# Patient Record
Sex: FEMALE | Race: OTHER | NOT HISPANIC OR LATINO
[De-identification: names, ages, dates, MRNs, and addresses within clinical notes are randomized per-mention and may not be internally consistent; named-entity substitution may affect disease eponyms.]

---

## 2020-08-04 PROBLEM — Z00.00 ENCOUNTER FOR PREVENTIVE HEALTH EXAMINATION: Status: ACTIVE | Noted: 2020-08-04

## 2020-08-07 ENCOUNTER — APPOINTMENT (OUTPATIENT)
Dept: NEUROLOGY | Facility: CLINIC | Age: 25
End: 2020-08-07

## 2020-08-24 ENCOUNTER — APPOINTMENT (OUTPATIENT)
Dept: NEUROLOGY | Facility: CLINIC | Age: 25
End: 2020-08-24
Payer: MEDICAID

## 2020-08-24 VITALS
SYSTOLIC BLOOD PRESSURE: 84 MMHG | HEIGHT: 63 IN | HEART RATE: 83 BPM | TEMPERATURE: 98 F | BODY MASS INDEX: 19.49 KG/M2 | WEIGHT: 110 LBS | DIASTOLIC BLOOD PRESSURE: 54 MMHG

## 2020-08-24 VITALS — TEMPERATURE: 97.8 F

## 2020-08-24 DIAGNOSIS — Z78.9 OTHER SPECIFIED HEALTH STATUS: ICD-10-CM

## 2020-08-24 DIAGNOSIS — H55.01 CONGENITAL NYSTAGMUS: ICD-10-CM

## 2020-08-24 PROCEDURE — 99205 OFFICE O/P NEW HI 60 MIN: CPT

## 2020-08-24 RX ORDER — FLUTICASONE PROPIONATE 50 UG/1
50 SPRAY, METERED NASAL
Qty: 16 | Refills: 0 | Status: DISCONTINUED | COMMUNITY
Start: 2020-05-04

## 2020-08-24 RX ORDER — LEVETIRACETAM 750 MG/1
750 TABLET, FILM COATED ORAL
Qty: 60 | Refills: 0 | Status: DISCONTINUED | COMMUNITY
Start: 2020-08-20

## 2020-08-24 RX ORDER — TERCONAZOLE 8 MG/G
0.8 CREAM VAGINAL
Qty: 20 | Refills: 0 | Status: DISCONTINUED | COMMUNITY
Start: 2020-06-17

## 2020-08-24 RX ORDER — AZITHROMYCIN 250 MG/1
250 TABLET, FILM COATED ORAL
Qty: 6 | Refills: 0 | Status: DISCONTINUED | COMMUNITY
Start: 2020-05-04

## 2020-08-24 RX ORDER — LEVETIRACETAM 500 MG/1
500 TABLET, FILM COATED ORAL
Qty: 60 | Refills: 0 | Status: DISCONTINUED | COMMUNITY
Start: 2020-03-18

## 2020-08-24 RX ORDER — LORATADINE 10 MG/1
10 TABLET ORAL
Qty: 30 | Refills: 0 | Status: DISCONTINUED | COMMUNITY
Start: 2020-05-04

## 2020-08-24 NOTE — HISTORY OF PRESENT ILLNESS
[FreeTextEntry1] : prior neurologist - Faviola Marsh MD -- Address: [facility] [place] [address] -- Phone: [phone]\par \par *** 08/24/2020  ***\par ELIO is a 25 y F LH with h/o seizure since 14yo.  Initially treated with divalproex, and changed to levetiracetam 750 q12 at age 15.  ELIO has seizures when she forgets to take levetiracetam.  Last seizure was last friday at 5a (forgot to take meds).  Mother heard a cry, and found ELIO with tonic posture - 20 - 30 seconds, no clonic movements.  Prior to that seizure was in March 16 2020, also because forgot to take medication.  Seizures may occur anytime, day or night. 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  \par \par FH - no h/o seizure, no illnesses common to family.\par ROS - netgative x 14 systems. \par \par Dev Hx - no febrile seizures, full term, normal birth,  no CNS infections, dx with ADD.

## 2020-08-24 NOTE — PHYSICAL EXAM
[FreeTextEntry1] : MS: alert & oriented to person, place time, good fund of knowledge and recall for recent and remote events. \par CN: VFF to confrontation, OD - 20/400, OS - 20/800, left beating nystagmus with right eye covering. right beating nystagmus with left eye covering. EOM full, ambylopia.  EOM full without nystagmus, PERRL, V1-V3 intact to light touch, face asymmetrical decreased NLF left, hears finger rub bilaterally, palate elevates symmetrically, shoulders elevate symmetrically, tongue midline\par MOTOR: normal tone x 4 extremities, Power 5/5 proximally and distally bilaterally, no drift, normal rapid alternating movements. \par SENSORY: intact LT x 4 extremities\par REFLEXES: biceps 2+ bilaterally, triceps 2+ bilaterally, brachioradialis 2+ bilaterally, patella 2+ bilaterally, ankle 2+ bilaterally, plantar flexor bilaterally\par COORD: normal FNF, no tremor or dysmetria\par GAIT: normal base, Romberg negative, normal gait, able to walk on toes, heels and tandem.

## 2020-08-24 NOTE — ASSESSMENT
[FreeTextEntry1] : ELIO developed seizures at age 13, changed to levetiracetam at age 15 due to breakthrough seizures (mother thinks that breakthrough seizures are due to missed doses). Last seizure occurred last week, prior to that occurred in March, both attributed to missed doses of levetiracetam. \par \par Plan:\par 1. change levetiracetam  q12 for better missed dose forgiveness.\par 2. MRI brain\par 3. amb EEG x 48 \par 4. RTC 6 wks \par 5. may need neuroophthalmology consult - pt endorses that her ophthalmologist has recommended prisms. \par \par -patient will receive information on seizure first aid, and orientation to epilepsy. \par -patient will receive information on mindfulness and basic exercise useful for stress reduction with goal of improved seizure control\par \par I have spent 60 minutes of face to face time with the patient reviewing the cause of seizures or seizure-like events, assessing the risk of recurrence, educating the patient or family to recognize seizures, and discussing possible treatment options and possible side effects of seizure medications. I also discussed seizure safety, and ways of reducing seizure risk. Greater than 50% of the encounter time was spent on counseling and coordination of care for reviewing records in [employer], discussion with patient regarding plan.

## 2020-08-24 NOTE — REASON FOR VISIT
[Initial Eval - Existing Diagnosis] : an initial evaluation of an existing diagnosis [Parent] : parent

## 2020-08-25 ENCOUNTER — TRANSCRIPTION ENCOUNTER (OUTPATIENT)
Age: 25
End: 2020-08-25

## 2020-09-21 ENCOUNTER — APPOINTMENT (OUTPATIENT)
Dept: NEUROLOGY | Facility: CLINIC | Age: 25
End: 2020-09-21
Payer: MEDICAID

## 2020-09-21 PROCEDURE — 95816 EEG AWAKE AND DROWSY: CPT

## 2020-09-22 PROCEDURE — 95708 EEG WO VID EA 12-26HR UNMNTR: CPT

## 2020-09-23 PROCEDURE — 95721 EEG PHY/QHP>36<60 HR W/O VID: CPT

## 2020-09-23 PROCEDURE — 95700 EEG CONT REC W/VID EEG TECH: CPT

## 2020-09-23 PROCEDURE — 95708 EEG WO VID EA 12-26HR UNMNTR: CPT

## 2020-09-27 ENCOUNTER — NON-APPOINTMENT (OUTPATIENT)
Age: 25
End: 2020-09-27

## 2020-10-05 ENCOUNTER — APPOINTMENT (OUTPATIENT)
Dept: NEUROLOGY | Facility: CLINIC | Age: 25
End: 2020-10-05

## 2020-10-14 ENCOUNTER — APPOINTMENT (OUTPATIENT)
Dept: MRI IMAGING | Facility: CLINIC | Age: 25
End: 2020-10-14
Payer: MEDICAID

## 2020-10-14 ENCOUNTER — TRANSCRIPTION ENCOUNTER (OUTPATIENT)
Age: 25
End: 2020-10-14

## 2020-10-14 ENCOUNTER — OUTPATIENT (OUTPATIENT)
Dept: OUTPATIENT SERVICES | Facility: HOSPITAL | Age: 25
LOS: 1 days | End: 2020-10-14
Payer: MEDICAID

## 2020-10-14 DIAGNOSIS — Z00.8 ENCOUNTER FOR OTHER GENERAL EXAMINATION: ICD-10-CM

## 2020-10-14 PROCEDURE — 70551 MRI BRAIN STEM W/O DYE: CPT | Mod: 26

## 2020-10-14 PROCEDURE — 70551 MRI BRAIN STEM W/O DYE: CPT

## 2020-12-08 ENCOUNTER — NON-APPOINTMENT (OUTPATIENT)
Age: 25
End: 2020-12-08

## 2021-03-30 ENCOUNTER — APPOINTMENT (OUTPATIENT)
Dept: NEUROLOGY | Facility: CLINIC | Age: 26
End: 2021-03-30
Payer: MEDICAID

## 2021-03-30 VITALS
SYSTOLIC BLOOD PRESSURE: 90 MMHG | WEIGHT: 101 LBS | BODY MASS INDEX: 17.89 KG/M2 | HEIGHT: 63 IN | HEART RATE: 83 BPM | DIASTOLIC BLOOD PRESSURE: 58 MMHG

## 2021-03-30 VITALS — TEMPERATURE: 97.2 F

## 2021-03-30 PROCEDURE — 99072 ADDL SUPL MATRL&STAF TM PHE: CPT

## 2021-03-30 PROCEDURE — 99214 OFFICE O/P EST MOD 30 MIN: CPT

## 2021-03-31 RX ORDER — GUAIFENESIN AND DEXTROMETHORPHAN HYDROBROMIDE 100; 10 MG/5ML; MG/5ML
100-10 LIQUID ORAL
Qty: 120 | Refills: 0 | Status: ACTIVE | COMMUNITY
Start: 2021-03-22

## 2021-03-31 RX ORDER — KETOCONAZOLE 20.5 MG/ML
2 SHAMPOO, SUSPENSION TOPICAL
Qty: 120 | Refills: 0 | Status: ACTIVE | COMMUNITY
Start: 2021-02-23

## 2021-03-31 RX ORDER — TRETINOIN 1 MG/G
0.1 CREAM TOPICAL
Qty: 45 | Refills: 0 | Status: ACTIVE | COMMUNITY
Start: 2021-02-23

## 2021-03-31 RX ORDER — PREDNISONE 10 MG/1
10 TABLET ORAL
Qty: 30 | Refills: 0 | Status: ACTIVE | COMMUNITY
Start: 2021-03-22

## 2021-03-31 RX ORDER — CLINDAMYCIN PHOSPHATE AND BENZOYL PEROXIDE 10; 25 MG/G; MG/G
1.2-2.5 GEL TOPICAL
Qty: 50 | Refills: 0 | Status: COMPLETED | COMMUNITY
Start: 2021-02-23

## 2021-03-31 RX ORDER — FLUTICASONE PROPIONATE 0.5 MG/G
0.05 CREAM TOPICAL
Qty: 60 | Refills: 0 | Status: ACTIVE | COMMUNITY
Start: 2021-02-23

## 2021-03-31 RX ORDER — ALBUTEROL SULFATE 90 UG/1
108 (90 BASE) INHALANT RESPIRATORY (INHALATION)
Qty: 18 | Refills: 0 | Status: ACTIVE | COMMUNITY
Start: 2021-03-20

## 2021-03-31 NOTE — HISTORY OF PRESENT ILLNESS
[FreeTextEntry1] : prior neurologist - Faviola Marsh MD -- Address: [facility] [place] [address] -- Phone: [phone]\par \par *** 08/24/2020  ***\par ELIO is a 25 y F LH with h/o seizure since 12yo.  Initially treated with divalproex, and changed to levetiracetam 750 q12 at age 15.  ELIO has seizures when she forgets to take levetiracetam.  Last seizure was last friday at 5a (forgot to take meds).  Mother heard a cry, and found ELIO with tonic posture - 20 - 30 seconds, no clonic movements.  Prior to that seizure was in March 16 2020, also because forgot to take medication.  Seizures may occur anytime, day or night. 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  \par \par FH - no h/o seizure, no illnesses common to family.\par ROS - netgative x 14 systems. \par \par Dev Hx - no febrile seizures, full term, normal birth,  no CNS infections, dx with ADD.

## 2021-03-31 NOTE — REASON FOR VISIT
[Initial Eval - Existing Diagnosis] : an initial evaluation of an existing diagnosis [Follow-Up: _____] : a [unfilled] follow-up visit [Parent] : parent

## 2021-10-04 ENCOUNTER — APPOINTMENT (OUTPATIENT)
Dept: NEUROLOGY | Facility: CLINIC | Age: 26
End: 2021-10-04
Payer: MEDICAID

## 2021-10-04 VITALS
HEART RATE: 80 BPM | DIASTOLIC BLOOD PRESSURE: 57 MMHG | SYSTOLIC BLOOD PRESSURE: 88 MMHG | HEIGHT: 63 IN | WEIGHT: 100 LBS | BODY MASS INDEX: 17.72 KG/M2

## 2021-10-04 PROCEDURE — 99214 OFFICE O/P EST MOD 30 MIN: CPT

## 2021-10-04 RX ORDER — BUDESONIDE AND FORMOTEROL FUMARATE DIHYDRATE 160; 4.5 UG/1; UG/1
160-4.5 AEROSOL RESPIRATORY (INHALATION)
Qty: 10 | Refills: 0 | Status: DISCONTINUED | COMMUNITY
Start: 2020-07-29 | End: 2021-10-04

## 2021-10-04 RX ORDER — FOLIC ACID/MULTIVIT,IRON,MINER 0.4MG-18MG
TABLET ORAL DAILY
Qty: 90 | Refills: 1 | Status: DISCONTINUED | COMMUNITY
Start: 2020-08-24 | End: 2021-10-04

## 2021-10-04 RX ORDER — TAVABOROLE 43.5 MG/ML
5 SOLUTION TOPICAL
Qty: 10 | Refills: 0 | Status: ACTIVE | COMMUNITY
Start: 2021-09-27

## 2021-10-04 RX ORDER — FLUOCINONIDE 0.5 MG/ML
0.05 SOLUTION TOPICAL
Qty: 60 | Refills: 0 | Status: ACTIVE | COMMUNITY
Start: 2021-07-20

## 2021-10-04 RX ORDER — BENZOYL PEROXIDE 100 MG/ML
10 LIQUID TOPICAL
Qty: 227 | Refills: 0 | Status: ACTIVE | COMMUNITY
Start: 2021-07-20

## 2021-10-04 RX ORDER — FLUTICASONE PROPIONATE 110 UG/1
110 AEROSOL, METERED RESPIRATORY (INHALATION)
Qty: 12 | Refills: 0 | Status: ACTIVE | COMMUNITY
Start: 2021-05-06

## 2021-10-04 NOTE — ASSESSMENT
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 Her AEEG 9/2020 suggestive of - occasional generalized spikes and 4-5 Hz spike/polyspike waves 1-2 seconds. 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 She has well controlled seizures when compliant with AED regimen of levetiracetam, and therefore we will continue the same regimen. She complains of substantial impairment due to her ADD. I will therefore order a neuropsychological evaluation to see what deficits she has and in which domains.\par \par Plan:\par -continue Levetiracetam  mg BID\par -discussed seizure triggers i.e AED compliance\par -neuropsychological testing referral for c/o of ADD-will consider stimulant rx after neuropsych evaluation. \par -follow up in 4 months\par \par I have spent 30 minutes or longer reviewing patient data or discussing with the patient  the cause of seizures or seizure-like events and comorbid conditions, assessing the risk of recurrence, educating the patient or family to recognize seizures, and discussing possible treatment options for seizures and comorbid conditions and possible side effects of medications. I also discussed seizure safety, and ways of reducing seizure risk. Greater than 50% of the encounter time was spent on counseling and coordination of care for reviewing records in [employer], discussion with patient regarding plan.

## 2021-10-04 NOTE — END OF VISIT
[] : Fellow [Time Spent: ___ minutes] : I have spent [unfilled] minutes of time on the encounter. [>50% of the face to face encounter time was spent on counseling and/or coordination of care for ___] : Greater than 50% of the face to face encounter time was spent on counseling and/or coordination of care for [unfilled] [FreeTextEntry3] : Ms. ELIO BARAHONA was seen and examined with Epilepsy fellow, Dr. Eulogio Pitts.  I reviewed history and plan with Ms. BARAHONA and edited the note.\par

## 2021-10-04 NOTE — HISTORY OF PRESENT ILLNESS
[FreeTextEntry1] : prior neurologist - Faviola Marsh MD -- Address: [address] [place] [address] -- Phone: [phone]\par \par **10/4/2021**\par Ms. BARAHONA presents for follow up. She has had no seizures since the last visit. She reports having a fluctuating mood, and gets annoyed easily, which she reports started since the COVID pandemic. She also reports that she has ADD, and is having difficulty paying attention during her classes, and frequently "zones out." She currently attends [employer]. Despite her attention difficulties, she is still able to get A's and B's. \par \par She reports compliance with her medications.\par \par *** 03/30/2021  ***\par Ms. BARAHONA reports that she may have had a seizure in Oct due to missed dose described as some alteration in consciousness. No other seizures.  Feels that mood "changes every day". Ms. BARAHONA endorses increased frustration and that "little things bother her more than they should".\par She describes mood changes every day but no depression/suicidal ideation. Her father feels she is doing well as far as her mood is concerned\par \par Levetiracetam ER 750mg BID\par \par *** 08/24/2020  ***\par ELIO is a 25 y F LH with h/o seizure since 12yo.  Initially treated with divalproex, and changed to levetiracetam 750 q12 at age 15.  ELIO has seizures when she forgets to take levetiracetam.  Last seizure was last friday at 5a (forgot to take meds).  Mother heard a cry, and found ELIO with tonic posture - 20 - 30 seconds, no clonic movements.  Prior to that seizure was in March 16 2020, also because forgot to take medication.  Seizures may occur anytime, day or night. 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  \par \par FH - no h/o seizure, no illnesses common to family.\par ROS - netgative x 14 systems. \par \par Dev Hx - no febrile seizures, full term, normal birth,  no CNS infections, dx with ADD.

## 2022-03-07 ENCOUNTER — APPOINTMENT (OUTPATIENT)
Dept: NEUROLOGY | Facility: CLINIC | Age: 27
End: 2022-03-07
Payer: MEDICAID

## 2022-03-07 PROCEDURE — 96116 NUBHVL XM PHYS/QHP 1ST HR: CPT

## 2022-03-07 PROCEDURE — 96132 NRPSYC TST EVAL PHYS/QHP 1ST: CPT

## 2022-03-07 PROCEDURE — 96139 PSYCL/NRPSYC TST TECH EA: CPT | Mod: NC

## 2022-03-07 PROCEDURE — 96121 NUBHVL XM PHY/QHP EA ADDL HR: CPT

## 2022-03-07 PROCEDURE — 96138 PSYCL/NRPSYC TECH 1ST: CPT | Mod: NC

## 2022-05-10 ENCOUNTER — APPOINTMENT (OUTPATIENT)
Dept: NEUROLOGY | Facility: CLINIC | Age: 27
End: 2022-05-10
Payer: MEDICAID

## 2022-05-10 PROCEDURE — 96139 PSYCL/NRPSYC TST TECH EA: CPT | Mod: NC

## 2022-05-10 PROCEDURE — 96133 NRPSYC TST EVAL PHYS/QHP EA: CPT

## 2022-05-16 ENCOUNTER — APPOINTMENT (OUTPATIENT)
Dept: NEUROLOGY | Facility: CLINIC | Age: 27
End: 2022-05-16
Payer: MEDICAID

## 2022-05-16 VITALS
OXYGEN SATURATION: 97 % | DIASTOLIC BLOOD PRESSURE: 60 MMHG | HEIGHT: 63 IN | WEIGHT: 105 LBS | BODY MASS INDEX: 18.61 KG/M2 | SYSTOLIC BLOOD PRESSURE: 88 MMHG | TEMPERATURE: 97.9 F | HEART RATE: 80 BPM

## 2022-05-16 PROCEDURE — 99214 OFFICE O/P EST MOD 30 MIN: CPT

## 2022-05-16 RX ORDER — KETOCONAZOLE 20 MG/G
2 CREAM TOPICAL
Qty: 60 | Refills: 0 | Status: ACTIVE | COMMUNITY
Start: 2022-03-21

## 2022-05-16 RX ORDER — TRIAMCINOLONE ACETONIDE 0.5 MG/G
0.05 OINTMENT TOPICAL
Qty: 430 | Refills: 0 | Status: ACTIVE | COMMUNITY
Start: 2022-01-24

## 2022-05-16 RX ORDER — TAZAROTENE 1 MG/G
0.1 CREAM TOPICAL
Qty: 30 | Refills: 0 | Status: ACTIVE | COMMUNITY
Start: 2022-03-21

## 2022-05-16 NOTE — ASSESSMENT
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 Her AEEG 9/2020 suggestive of - occasional generalized spikes and 4-5 Hz spike/polyspike waves 1-2 seconds. 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 She has well controlled seizures when compliant with AED regimen of levetiracetam, and therefore we will continue the same regimen. She complains of substantial impairment due to her ADD. I will therefore order a neuropsychological evaluation to see what deficits she has and in which domains.\par \par Plan:\par -continue Levetiracetam  mg BID\par -discussed seizure triggers i.e AED compliance\par -We will plan for trial of stimulant medication beginning in the fall when she resumes classes.\par – Follow-up end of August, beginning September.\par \par I have spent 30 minutes or longer reviewing patient data or discussing with the patient  the cause of seizures or seizure-like events and comorbid conditions like ADD, assessing the risk of seizure recurrence, educating the patient or family to recognize seizures, and discussing possible treatment options for seizures and comorbid conditions and possible side effects of medications. I also discussed seizure safety, and ways of reducing seizure risk. Greater than 50% of the encounter time was spent on counseling and coordination of care for reviewing records in [employer], discussion with patient regarding plan.

## 2022-05-16 NOTE — HISTORY OF PRESENT ILLNESS
[FreeTextEntry1] : prior neurologist - Faviola Marsh MD -- Address: [address] [place] [address] -- Phone: [phone]\par \par *** 05/16/2022  ***\par Elio returns for scheduled follow-up.  She has no interval seizures.  She has seen Dr. Veras, and undergone neuropsychological evaluation.  Results of that evaluation are in the chart.  Major results are evidence of ADD impacting on executive functions and attention.  There is also some mild difficulty with word recall, as well as higher-order visual processing.  Test noted anxiety, but not significant depression.  Recommendations included consideration of medications for ADD.  Elio resumes classes in the fall.  She has no plans for summer school this summer.\par \par *** 03/30/2021  ***\par Ms. BARAHONA reports that she may have had a seizure in Oct due to missed dose described as some alteration in consciousness. No other seizures.  Feels that mood "changes every day". Ms. BARAHONA endorses increased frustration and that "little things bother her more than they should".\par She describes mood changes every day but no depression/suicidal ideation. Her father feels she is doing well as far as her mood is concerned\par \par Levetiracetam ER 750mg BID\par \par *** 08/24/2020  ***\par ELIO is a 25 y F LH with h/o seizure since 14yo.  Initially treated with divalproex, and changed to levetiracetam 750 q12 at age 15.  ELIO has seizures when she forgets to take levetiracetam.  Last seizure was last friday at 5a (forgot to take meds).  Mother heard a cry, and found ELIO with tonic posture - 20 - 30 seconds, no clonic movements.  Prior to that seizure was in March 16 2020, also because forgot to take medication.  Seizures may occur anytime, day or night. 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  \par \par FH - no h/o seizure, no illnesses common to family.\par ROS - netgative x 14 systems. \par \par Dev Hx - no febrile seizures, full term, normal birth,  no CNS infections, dx with ADD.

## 2022-10-12 ENCOUNTER — APPOINTMENT (OUTPATIENT)
Dept: NEUROLOGY | Facility: CLINIC | Age: 27
End: 2022-10-12

## 2022-10-18 ENCOUNTER — APPOINTMENT (OUTPATIENT)
Dept: NEUROLOGY | Facility: CLINIC | Age: 27
End: 2022-10-18
Payer: MEDICAID

## 2022-10-18 PROCEDURE — 96132 NRPSYC TST EVAL PHYS/QHP 1ST: CPT | Mod: 1L,95

## 2022-12-08 ENCOUNTER — RX RENEWAL (OUTPATIENT)
Age: 27
End: 2022-12-08

## 2023-01-10 ENCOUNTER — APPOINTMENT (OUTPATIENT)
Dept: NEUROLOGY | Facility: CLINIC | Age: 28
End: 2023-01-10
Payer: MEDICAID

## 2023-01-10 VITALS
SYSTOLIC BLOOD PRESSURE: 102 MMHG | WEIGHT: 105 LBS | HEIGHT: 63 IN | RESPIRATION RATE: 16 BRPM | BODY MASS INDEX: 18.61 KG/M2 | DIASTOLIC BLOOD PRESSURE: 56 MMHG | HEART RATE: 88 BPM

## 2023-01-10 PROCEDURE — 99214 OFFICE O/P EST MOD 30 MIN: CPT

## 2023-01-16 NOTE — ASSESSMENT
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 Her AEEG 9/2020 suggestive of - occasional generalized spikes and 4-5 Hz spike/polyspike waves 1-2 seconds. 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 She has well controlled seizures when compliant with AED regimen of levetiracetam, and therefore we will continue the same regimen. She complains of substantial impairment due to her ADD. I will therefore order a neuropsychological evaluation to see what deficits she has and in which domains.\par \par Plan:\par -continue Levetiracetam  mg BID\par -discussed seizure triggers i.e AED compliance\par -Adderall (amphetamine/dextramphetamine) ER trial - low dose and titrate up as needed. Only to be used during school days with medication holiday on WE and when not in school. \par -RTC 6 mo\par \par I have spent 30 minutes or longer reviewing patient data or discussing with the patient  the cause of seizures or seizure-like events and comorbid conditions, assessing the risk of recurrence, educating the patient or family to recognize seizures, and discussing possible treatment options for seizures and comorbid conditions and possible side effects of medications. I also discussed seizure safety, and ways of reducing seizure risk. Greater than 50% of the encounter time was spent on counseling and coordination of care for reviewing records in [employer], discussion with patient regarding plan.

## 2023-01-16 NOTE — HISTORY OF PRESENT ILLNESS
[FreeTextEntry1] : prior neurologist - Faviola Marsh MD -- Address: [address] [place] [address] -- Phone: [phone]\par \par *** 01/10/2023  ***\par ELIO had one seizure before Thanksgiving in setting of missed medication.  She would like to try Adderall for ADD. No new problems. \par \par *** 05/16/2022  ***\par Elio returns for scheduled follow-up.  She has no interval seizures.  She has seen Dr. Veras, and undergone neuropsychological evaluation.  Results of that evaluation are in the chart.  Major results are evidence of ADD impacting on executive functions and attention.  There is also some mild difficulty with word recall, as well as higher-order visual processing.  Test noted anxiety, but not significant depression.  Recommendations included consideration of medications for ADD.  Elio resumes classes in the fall.  She has no plans for summer school this summer.\par \par *** 03/30/2021  ***\par Ms. BARAHONA reports that she may have had a seizure in Oct due to missed dose described as some alteration in consciousness. No other seizures.  Feels that mood "changes every day". Ms. BARAHONA endorses increased frustration and that "little things bother her more than they should".\par She describes mood changes every day but no depression/suicidal ideation. Her father feels she is doing well as far as her mood is concerned\par \par Levetiracetam ER 750mg BID\par \par *** 08/24/2020  ***\par ELIO is a 25 y F LH with h/o seizure since 12yo.  Initially treated with divalproex, and changed to levetiracetam 750 q12 at age 15.  ELIO has seizures when she forgets to take levetiracetam.  Last seizure was last friday at 5a (forgot to take meds).  Mother heard a cry, and found ELIO with tonic posture - 20 - 30 seconds, no clonic movements.  Prior to that seizure was in March 16 2020, also because forgot to take medication.  Seizures may occur anytime, day or night. 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  \par \par FH - no h/o seizure, no illnesses common to family.\par ROS - netgative x 14 systems. \par \par Dev Hx - no febrile seizures, full term, normal birth,  no CNS infections, dx with ADD.

## 2023-03-06 RX ORDER — DEXTROAMPHETAMINE SACCHARATE, AMPHETAMINE ASPARTATE MONOHYDRATE, DEXTROAMPHETAMINE SULFATE AND AMPHETAMINE SULFATE 2.5; 2.5; 2.5; 2.5 MG/1; MG/1; MG/1; MG/1
10 CAPSULE, EXTENDED RELEASE ORAL
Qty: 25 | Refills: 0 | Status: DISCONTINUED | COMMUNITY
Start: 2023-01-10 | End: 2023-03-06

## 2023-07-11 ENCOUNTER — APPOINTMENT (OUTPATIENT)
Dept: NEUROLOGY | Facility: CLINIC | Age: 28
End: 2023-07-11
Payer: MEDICAID

## 2023-07-11 VITALS
SYSTOLIC BLOOD PRESSURE: 92 MMHG | WEIGHT: 105 LBS | BODY MASS INDEX: 18.61 KG/M2 | HEART RATE: 62 BPM | HEIGHT: 63 IN | DIASTOLIC BLOOD PRESSURE: 59 MMHG

## 2023-07-11 DIAGNOSIS — F98.8 OTHER SPECIFIED BEHAVIORAL AND EMOTIONAL DISORDERS WITH ONSET USUALLY OCCURRING IN CHILDHOOD AND ADOLESCENCE: ICD-10-CM

## 2023-07-11 PROCEDURE — 99214 OFFICE O/P EST MOD 30 MIN: CPT

## 2023-07-11 NOTE — END OF VISIT
[FreeTextEntry3] : Ms. ELIO BARAHONA was seen and examined with Epilepsy fellow, Dr. Deedee Oden.  I reviewed history and plan with Ms. BARAHONA and edited the note.\par

## 2023-07-11 NOTE — ASSESSMENT
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 Her AEEG 9/2020 suggestive of - occasional generalized spikes and 4-5 Hz spike/polyspike waves 1-2 seconds. MRI brain 10/2020 normal reveals and incidental finding of an arachnoid cyst affecting posterior fossa. Her seizures are well controlled with current medication regimen and increased compliance therefore we will continue the same regimen. She complains of substantial impairment due to her ADD but was not able to have her Adderall prescription refilled so has not started this medication yet.\par \par Plan:\par -continue Levetiracetam  mg BID\par -discussed seizure triggers i.e AED compliance\par -Script for Adderall sent to hospital pharmacy. \par -follow up in 6 months\par \par I have spent 30 minutes or longer reviewing patient data or discussing with the patient  the cause of seizures or seizure-like events and comorbid conditions, assessing the risk of recurrence, educating the patient or family to recognize seizures, and discussing possible treatment options for seizures and comorbid conditions and possible side effects of medications. I also discussed seizure safety, and ways of reducing seizure risk. Greater than 50% of the encounter time was spent on counseling and coordination of care for reviewing records in [employer], discussion with patient regarding plan.

## 2023-07-11 NOTE — HISTORY OF PRESENT ILLNESS
[FreeTextEntry1] : prior neurologist - Faviola Marsh MD -- Address: [address] [place] [address] -- Phone: [phone]\par \par *** 07/11/2023  ***\par Ms. Barahona stated that she has not any seizures since last visit and has been compliant with her medication without any missed doses and significant side effects. She is using pillbox to assure compliance. She was not able to have Adderall filled by pharmacy hence has not started yet and requests it to be sent again. She does not have any other new concerns.\par \par *** 01/10/2023  ***\par ELIO had one seizure before Thanksgiving in setting of missed medication.  She would like to try Adderall for ADD. No new problems. \par \par *** 05/16/2022  ***\par Elio returns for scheduled follow-up.  She has no interval seizures.  She has seen Dr. Veras, and undergone neuropsychological evaluation.  Results of that evaluation are in the chart.  Major results are evidence of ADD impacting on executive functions and attention.  There is also some mild difficulty with word recall, as well as higher-order visual processing.  Test noted anxiety, but not significant depression.  Recommendations included consideration of medications for ADD.  Elio resumes classes in the fall.  She has no plans for summer school this summer.\par \par *** 03/30/2021  ***\par Ms. BARAHONA reports that she may have had a seizure in Oct due to missed dose described as some alteration in consciousness. No other seizures.  Feels that mood "changes every day". Ms. BARAHONA endorses increased frustration and that "little things bother her more than they should".\par She describes mood changes every day but no depression/suicidal ideation. Her father feels she is doing well as far as her mood is concerned\par \par Levetiracetam ER 750mg BID\par \par *** 08/24/2020  ***\par ELIO is a 25 y F LH with h/o seizure since 12yo.  Initially treated with divalproex, and changed to levetiracetam 750 q12 at age 15.  ELIO has seizures when she forgets to take levetiracetam.  Last seizure was last friday at 5a (forgot to take meds).  Mother heard a cry, and found ELIO with tonic posture - 20 - 30 seconds, no clonic movements.  Prior to that seizure was in March 16 2020, also because forgot to take medication.  Seizures may occur anytime, day or night. 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 \par All - none\par \par Social  -no tobacco, no ETOH, no drugs,  not working currently - has worked as [employer], completed jacquelyn year in college.  \par \par FH - no h/o seizure, no illnesses common to family.\par ROS - netgative x 14 systems. \par \par Dev Hx - no febrile seizures, full term, normal birth,  no CNS infections, dx with ADD.

## 2023-07-11 NOTE — PHYSICAL EXAM
[FreeTextEntry1] : alert and oriented, speech fluent, answers questions appropriately, good recall for recent and remote events.\par EOM full, face symmetrical, no dysarthria\par Motor - spontaneous movements of all four extremities without sign of weakness.\par Sensory - \par Coordination - no tremor\par Gait - normal gait.\par

## 2023-08-08 PROBLEM — K80.20 GALLSTONES: Status: ACTIVE | Noted: 2023-08-08

## 2023-08-10 ENCOUNTER — APPOINTMENT (OUTPATIENT)
Age: 28
End: 2023-08-10
Payer: MEDICAID

## 2023-08-10 VITALS
WEIGHT: 110 LBS | HEIGHT: 63 IN | SYSTOLIC BLOOD PRESSURE: 92 MMHG | BODY MASS INDEX: 19.49 KG/M2 | DIASTOLIC BLOOD PRESSURE: 54 MMHG | HEART RATE: 79 BPM

## 2023-08-10 VITALS — HEIGHT: 60.63 IN | BODY MASS INDEX: 20.27 KG/M2 | WEIGHT: 106 LBS

## 2023-08-10 DIAGNOSIS — K80.20 CALCULUS OF GALLBLADDER W/OUT CHOLECYSTITIS W/OUT OBSTRUCTION: ICD-10-CM

## 2023-08-10 DIAGNOSIS — Z56.0 UNEMPLOYMENT, UNSPECIFIED: ICD-10-CM

## 2023-08-10 PROCEDURE — 99204 OFFICE O/P NEW MOD 45 MIN: CPT

## 2023-08-10 RX ORDER — BENZONATATE 100 MG/1
100 CAPSULE ORAL
Qty: 21 | Refills: 0 | Status: COMPLETED | COMMUNITY
Start: 2021-08-17 | End: 2023-08-10

## 2023-08-10 SDOH — ECONOMIC STABILITY - INCOME SECURITY: UNEMPLOYMENT, UNSPECIFIED: Z56.0

## 2023-08-10 NOTE — HISTORY OF PRESENT ILLNESS
[de-identified] : Ms. ELIO BARAHONA is a 28 year  old patient who was referred by BERTHA Méndez and Jonathon Lira  with the chief complaint of having right upper quadrant  and epigastric pain on and off  for2 months. Pain is non-radiating. She reports no nausea or vomiting and no history of jaundice, acholia or acholuria.   Appetite is good and weight is stable.   She   has no family history of biliary tract disease.  Ms. BARAHONA  is s/p appendectomy.  Last seizures were last year in November.  She had an abdominal sonogram on  07/11/2023 which revealed GB stones . CBD is normal

## 2023-08-10 NOTE — PLAN
[FreeTextEntry1] : Ms. BARAHONA  was told significance of findings, options, risks and benefits were explained.  Informed consent for laparoscopic/possible open  cholecystectomy  and potential risks, benefits and alternatives (surgical options were discussed including non-surgical options or the option of no surgery) to the planned surgery were discussed in depth.  All surgical options were discussed including non-surgical treatments.  She wishes to proceed with surgery.  We will plan for surgery on at the next available date, pending any required insurance pre-certification or pre-approval. She agrees to obtain any necessary pre-operative evaluations and testing prior to surgery. Patient instructed to maintain a fat-free diet, and to seek immediate medical attention with any acute change or worsening of symptoms, including but not limited to abdominal pain, fever, chills, nausea, vomiting, or yellowing of the skin.  Patient advised to seek immediate medical attention with any acute change in symptoms or with the development of any new or worsening symptoms.  Patient's questions and concerns addressed to patient's satisfaction, and patient verbalized an understanding of the information discussed.

## 2023-08-10 NOTE — DATA REVIEWED
[FreeTextEntry1] : Patient Name ELIO BARAHONA Date of Birth 1995 Procedure US ABDOMEN Study Date & Time 2023-07-11 8:10 AM Patient ID [id number] Accession Number [id number] Referring Physician BERTHA SMITH Institution Name [facility] Report RADIOLOGY REPORT FINDINGS FINDING Reason for examination : Discomfort Comparison: None Sonographic evaluation of the upper abdomen reveals : MEASUREMENTS: CBD:3 mm RIGHT KIDNEY:9.2 cm LEFT KIDNEY:9.6 cm SPLEEN:7.9 cm FINDINGS: LIVER: Normal in size without evidence of focal mass or biliary dilatation . GALL BLADDER : There are gallstones. There is no gallbladder wall thickening or pericholecystic fluid. PANCREAS : Visualized portion unremarkable. SPLEEN: without focal mass. KIDNEYS: There is no evidence of mass, calculus or hydronephrosis. ASCITES : None VASCULAR : Proximal aorta and IVC are unremarkable. 8/8/23, 10:23 AM [employer] [email or web address] IMPRESSION: Gallstones Otherwise unremarkable abdominal ultrasound Electronically signed by: Chanell Arrieta MD 7/11/2023 9:06 AM Workstation: [id number] Electronically Signed By: Chanell Arrieta MD Sign Date: 11-JUL-23 [facility] Radiology

## 2023-08-10 NOTE — CONSULT LETTER
[Dear  ___] : Dear  [unfilled], [Consult Letter:] : I had the pleasure of evaluating your patient, [unfilled]. [Please see my note below.] : Please see my note below. [Consult Closing:] : Thank you very much for allowing me to participate in the care of this patient.  If you have any questions, please do not hesitate to contact me. [Sincerely,] : Sincerely, [FreeTextEntry3] : Jaren Bartlett MD, FACS

## 2023-08-10 NOTE — PHYSICAL EXAM
[Abdominal Masses] : No abdominal masses [Abdomen Tenderness] : ~T ~M No abdominal tenderness [Alert] : alert [Oriented to Person] : oriented to person [Oriented to Place] : oriented to place [Oriented to Time] : oriented to time [Calm] : calm [de-identified] : She  is alert, well-groomed, and in NAD   [de-identified] : anicteric.  Nasal mucosa pink, septum midline. Oral mucosa pink.  Tongue midline, Pharynx without exudates.   [de-identified] : Neck supple. Trachea midline. Thyroid isthmus barely palpable, lobes not felt.   [de-identified] : no hernia

## 2023-08-16 ENCOUNTER — OUTPATIENT (OUTPATIENT)
Dept: OUTPATIENT SERVICES | Facility: HOSPITAL | Age: 28
LOS: 1 days | End: 2023-08-16
Payer: MEDICAID

## 2023-08-16 VITALS
HEART RATE: 65 BPM | RESPIRATION RATE: 18 BRPM | DIASTOLIC BLOOD PRESSURE: 54 MMHG | OXYGEN SATURATION: 99 % | SYSTOLIC BLOOD PRESSURE: 93 MMHG | TEMPERATURE: 98 F

## 2023-08-16 DIAGNOSIS — K80.20 CALCULUS OF GALLBLADDER WITHOUT CHOLECYSTITIS WITHOUT OBSTRUCTION: ICD-10-CM

## 2023-08-16 DIAGNOSIS — G40.909 EPILEPSY, UNSPECIFIED, NOT INTRACTABLE, WITHOUT STATUS EPILEPTICUS: ICD-10-CM

## 2023-08-16 DIAGNOSIS — Z01.812 ENCOUNTER FOR PREPROCEDURAL LABORATORY EXAMINATION: ICD-10-CM

## 2023-08-16 DIAGNOSIS — H55.01 CONGENITAL NYSTAGMUS: Chronic | ICD-10-CM

## 2023-08-16 LAB
APTT BLD: 33.6 SEC — SIGNIFICANT CHANGE UP (ref 24.5–35.6)
BLD GP AB SCN SERPL QL: SIGNIFICANT CHANGE UP
INR BLD: 1.01 RATIO — SIGNIFICANT CHANGE UP (ref 0.85–1.18)
PROTHROM AB SERPL-ACNC: 11.5 SEC — SIGNIFICANT CHANGE UP (ref 9.5–13)

## 2023-08-16 RX ORDER — LEVETIRACETAM 250 MG/1
1 TABLET, FILM COATED ORAL
Refills: 0 | DISCHARGE

## 2023-08-16 NOTE — H&P PST ADULT - NSICDXFAMILYHX_GEN_ALL_CORE_FT
FAMILY HISTORY:  Grandparent  Still living? Unknown  Family history of cholelithiasis, Age at diagnosis: Age Unknown  Family history of renal cancer, Age at diagnosis: Age Unknown

## 2023-08-16 NOTE — H&P PST ADULT - REASON FOR ADMISSION
Laparoscopic Cholecystectomy with Intra-Operative Cholangiogram possible open on 8/22/23 with Dr. Bartlett

## 2023-08-16 NOTE — H&P PST ADULT - PROBLEM SELECTOR PLAN 1
Pt schedule for Laparoscopic Cholecystectomy with Intra-Operative Cholangiogram possible open on 8/22/23 with Dr. Bartlett.     Labs drawn in PCP/PST - results in chart  Pt Medical clearance 8/15/23 in chart      Pt was  instructed to stop aspirin/ecotrin and all over the counter medication including vitamins and herbal supplements one week prior to surgery   Instructions given on the use of 4% chlorhexidine wash and Pt verbalized understanding of same   Pt Instructed to have nothing by mouth starting midnight day before surgery  Patient is to expect a phone call day before surgery between the hours of 430- 630pm giving arrival time for surgery   Written and verbal preoperative instructions given to patient with understanding verbalized.     Patient today with STOP bang score 1  Low  risk for GILLIAN

## 2023-08-16 NOTE — H&P PST ADULT - HISTORY OF PRESENT ILLNESS
28 year old patient who was referred by BERTHA Méndez and Jonathon Lira with the chief complaint of having right upper quadrant and epigastric pain on and off for 2 months. Pain is non-radiating. She reports no nausea or vomiting and no history of jaundice, acholia or acholuria. Appetite is good and weight is stable. She has no family history of biliary tract disease. Ms. BARAHONA is s/p appendectomy. Last seizures were last year in November.   She had an abdominal sonogram on 07/11/2023 which revealed GB stones. CBD is normal.      Active Problems  Adult attention deficit disorder (314.00) (F98.8)  Epilepsy undetermined as to focal or generalized (345.90) (G40.909)  Gallstones (574.20) (K80.20)  Nystagmus, congenital (379.51) (H55.01)       History of Appendectomy (2006)  History of Eye surgery (2020)           27 y/o female with PMH of ADHD (not on any meds), Epilepsy, Cholelithiasis. PSH of Appendectomy (2006) and Eye Surgery for Congenital Nystagmus (2020). Pt c/o having right upper quadrant and epigastric pain on and off for 2 months. Found to have gallstones on recent Ultrasound. Patient presents to Pre-Surgical Testing for scheduled surgery of Laparoscopic Cholecystectomy with Intra-Operative Cholangiogram possible open on 8/22/23 with Dr. Bartlett.

## 2023-08-17 PROCEDURE — G0463: CPT

## 2023-08-22 ENCOUNTER — APPOINTMENT (OUTPATIENT)
Dept: SURGERY | Facility: HOSPITAL | Age: 28
End: 2023-08-22

## 2023-09-15 PROBLEM — Z87.19 PERSONAL HISTORY OF OTHER DISEASES OF THE DIGESTIVE SYSTEM: Chronic | Status: ACTIVE | Noted: 2023-08-16

## 2023-09-15 PROBLEM — G40.909 EPILEPSY, UNSPECIFIED, NOT INTRACTABLE, WITHOUT STATUS EPILEPTICUS: Chronic | Status: ACTIVE | Noted: 2023-08-16

## 2023-09-15 PROBLEM — F90.9 ATTENTION-DEFICIT HYPERACTIVITY DISORDER, UNSPECIFIED TYPE: Chronic | Status: ACTIVE | Noted: 2023-08-16

## 2024-01-09 ENCOUNTER — APPOINTMENT (OUTPATIENT)
Dept: NEUROLOGY | Facility: CLINIC | Age: 29
End: 2024-01-09
Payer: MEDICAID

## 2024-01-09 VITALS
HEART RATE: 65 BPM | DIASTOLIC BLOOD PRESSURE: 64 MMHG | SYSTOLIC BLOOD PRESSURE: 100 MMHG | WEIGHT: 102 LBS | BODY MASS INDEX: 19.51 KG/M2 | HEIGHT: 60.5 IN

## 2024-01-09 DIAGNOSIS — G40.909 EPILEPSY, UNSPECIFIED, NOT INTRACTABLE, W/OUT STATUS EPILEPTICUS: ICD-10-CM

## 2024-01-09 PROCEDURE — 99214 OFFICE O/P EST MOD 30 MIN: CPT

## 2024-01-09 RX ORDER — METHYLPHENIDATE HYDROCHLORIDE 18 MG/1
18 TABLET, EXTENDED RELEASE ORAL DAILY
Qty: 30 | Refills: 0 | Status: DISCONTINUED | COMMUNITY
Start: 2023-03-06 | End: 2024-01-09

## 2024-02-28 RX ORDER — LEVETIRACETAM 750 MG/1
750 TABLET, EXTENDED RELEASE ORAL
Qty: 270 | Refills: 3 | Status: ACTIVE | COMMUNITY
Start: 2020-08-24 | End: 1900-01-01

## 2024-03-04 RX ORDER — METHYLPHENIDATE HYDROCHLORIDE 10 MG/1
10 TABLET ORAL
Qty: 60 | Refills: 0 | Status: ACTIVE | COMMUNITY
Start: 2024-03-04 | End: 1900-01-01

## 2024-03-07 ENCOUNTER — NON-APPOINTMENT (OUTPATIENT)
Age: 29
End: 2024-03-07

## 2024-07-09 ENCOUNTER — APPOINTMENT (OUTPATIENT)
Dept: NEUROLOGY | Facility: CLINIC | Age: 29
End: 2024-07-09

## 2024-08-26 ENCOUNTER — APPOINTMENT (OUTPATIENT)
Dept: NEUROLOGY | Facility: CLINIC | Age: 29
End: 2024-08-26
Payer: MEDICAID

## 2024-08-26 DIAGNOSIS — F98.8 OTHER SPECIFIED BEHAVIORAL AND EMOTIONAL DISORDERS WITH ONSET USUALLY OCCURRING IN CHILDHOOD AND ADOLESCENCE: ICD-10-CM

## 2024-08-26 DIAGNOSIS — G40.909 EPILEPSY, UNSPECIFIED, NOT INTRACTABLE, W/OUT STATUS EPILEPTICUS: ICD-10-CM

## 2024-08-26 PROCEDURE — 99214 OFFICE O/P EST MOD 30 MIN: CPT

## 2024-08-26 NOTE — PHYSICAL EXAM
[FreeTextEntry1] : alert and oriented x 3, speech fluent, names easily, follows requests, good recall for recent and remote events. EOM full without sustained nystagmus, PERRL, intact LT V1-V3 bilaterally, face symmetrical, no dysarthria, tongue midline, normal shoulder elevation. Motor - normal motion in upper extremities bilaterally. normal rapid-alternating movements, no drift Sensory - intact LT x 4 ext Coord - no tremor, ataxia Gait - stands without difficulty

## 2024-08-26 NOTE — HISTORY OF PRESENT ILLNESS
[FreeTextEntry1] : prior neurologist - Faviola Marsh MD -- Address: [address] [place] [address] -- Phone: [phone]  *** 08/26/2024  *** ELIO has one seizure in April - feels that she was "stressed out". States that her sleep schedule is variable - normally sleeps about 6h.  In April, she thinks that her sleep was a little less in April.  She uses pillbox.  Ms. BARAHONA feels that she's been more irritable since May - but irritability has not been problem over longer period - less likely due to levetiracetam. Ms. BARAHONA would like to get repeat MRI - to assess interval change in the arachnoid cyst. Ms. BARAHONA would like to use stimulant for school and work performance.   *** 01/09/2024  ***  ELIO had one seizure at 6a New Years Day - after going to bed at 2a.  Seizure was a convulsion arising out of sleep.  Family did not witness the convulsion, but realized that the seizure must of occurred when Elio appeared confused and off balance.  Otherwise doing well. She is not taking Ritalin.  Since last visit, she had surgery to remove her gallbladder due to gallstones.  *** 07/11/2023  *** Ms. Barahona stated that she has not any seizures since last visit and has been compliant with her medication without any missed doses and significant side effects. She is using pillbox to assure compliance. She was not able to have Adderall filled by pharmacy hence has not started yet and requests it to be sent again. She does not have any other new concerns.  *** 01/10/2023  *** ELIO had one seizure before Thanksgiving in setting of missed medication.  She would like to try Adderall for ADD. No new problems.   *** 05/16/2022  *** Elio returns for scheduled follow-up.  She has no interval seizures.  She has seen Dr. Veras, and undergone neuropsychological evaluation.  Results of that evaluation are in the chart.  Major results are evidence of ADD impacting on executive functions and attention.  There is also some mild difficulty with word recall, as well as higher-order visual processing.  Test noted anxiety, but not significant depression.  Recommendations included consideration of medications for ADD.  Elio resumes classes in the fall.  She has no plans for summer school this summer.  *** 03/30/2021  *** Ms. BARAHONA reports that she may have had a seizure in Oct due to missed dose described as some alteration in consciousness. No other seizures.  Feels that mood "changes every day". Ms. BARAHONA endorses increased frustration and that "little things bother her more than they should". She describes mood changes every day but no depression/suicidal ideation. Her father feels she is doing well as far as her mood is concerned  Levetiracetam ER 750mg BID  *** 08/24/2020  *** ELIO is a 25 y F LH with h/o seizure since 14yo.  Initially treated with divalproex, and changed to levetiracetam 750 q12 at age 15.  ELIO has seizures when she forgets to take levetiracetam.  Last seizure was last friday at 5a (forgot to take meds).  Mother heard a cry, and found ELIO with tonic posture - 20 - 30 seconds, no clonic movements.  Prior to that seizure was in March 16 2020, also because forgot to take medication.  Seizures may occur anytime, day or night.   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  All - none  Social  -no tobacco, no ETOH, no drugs,  not working currently - has worked as [employer], completed jacquelyn year in college.    FH - no h/o seizure, no illnesses common to family. ROS - netgative x 14 systems.   Dev Hx - no febrile seizures, full term, normal birth,  no CNS infections, dx with ADD.

## 2024-08-26 NOTE — REASON FOR VISIT
[Home] : at home, [unfilled] , at the time of the visit. [Medical Office: ([facility])___] : at the medical office located in  [Patient] : the patient [Self] : self [Follow-Up: _____] : a [unfilled] follow-up visit

## 2024-08-26 NOTE — ASSESSMENT
[FreeTextEntry1] : MS ELIO BARAHONA is a 29 yo woman with possibly generalized epilepsy who has mostly controlled seizures when compliant with AED regimen of levetiracetam. Her AEEG 9/2020 suggestive of - occasional generalized spikes and 4-5 Hz spike/polyspike waves 1-2 seconds. MRI brain 10/2020 normal reveals and incidental finding of an arachnoid cyst affecting posterior fossa. She complains of substantial impairment due to her ADD but was not able to have her Adderall prescription refilled so has not started this medication yet.  Plan: -increase Levetiracetam ER 1000 mg BID -discussed seizure triggers i.e AED compliance; sleep deprivation -Script for methylphenidate sent to hospital pharmacy.  -f/u Jan 2025 -labs - levetiracetam level, CBC, CMP  I have spent 30 minutes or longer reviewing patient data or discussing with the patient the cause of seizures or seizure-like events and comorbid conditions, assessing the risk of recurrence, educating the patient or family to recognize seizures, and discussing possible treatment options for seizures and comorbid conditions and possible side effects of medications. I also discussed seizure safety, and ways of reducing seizure risk. Greater than 50% of the encounter time was spent on counseling and coordination of care for reviewing records in [employer], discussion with patient regarding plan.

## 2024-09-09 ENCOUNTER — OUTPATIENT (OUTPATIENT)
Dept: OUTPATIENT SERVICES | Facility: HOSPITAL | Age: 29
LOS: 1 days | End: 2024-09-09
Payer: MEDICAID

## 2024-09-09 ENCOUNTER — APPOINTMENT (OUTPATIENT)
Dept: MRI IMAGING | Facility: CLINIC | Age: 29
End: 2024-09-09
Payer: MEDICAID

## 2024-09-09 DIAGNOSIS — G40.909 EPILEPSY, UNSPECIFIED, NOT INTRACTABLE, WITHOUT STATUS EPILEPTICUS: ICD-10-CM

## 2024-09-09 DIAGNOSIS — H55.01 CONGENITAL NYSTAGMUS: Chronic | ICD-10-CM

## 2024-09-09 PROCEDURE — 70551 MRI BRAIN STEM W/O DYE: CPT | Mod: 26

## 2024-09-09 PROCEDURE — 70551 MRI BRAIN STEM W/O DYE: CPT

## 2024-09-09 PROCEDURE — 76377 3D RENDER W/INTRP POSTPROCES: CPT | Mod: 26

## 2024-09-09 PROCEDURE — 76377 3D RENDER W/INTRP POSTPROCES: CPT

## 2024-11-20 RX ORDER — LEVETIRACETAM 750 MG/1
750 TABLET, EXTENDED RELEASE ORAL
Qty: 90 | Refills: 3 | Status: ACTIVE | COMMUNITY
Start: 2024-11-20 | End: 1900-01-01

## 2024-11-25 DIAGNOSIS — Z51.81 ENCOUNTER FOR THERAPEUTIC DRUG LVL MONITORING: ICD-10-CM

## 2025-01-06 ENCOUNTER — APPOINTMENT (OUTPATIENT)
Facility: CLINIC | Age: 30
End: 2025-01-06

## 2025-01-21 ENCOUNTER — NON-APPOINTMENT (OUTPATIENT)
Age: 30
End: 2025-01-21

## 2025-06-11 ENCOUNTER — APPOINTMENT (OUTPATIENT)
Dept: NEUROLOGY | Facility: CLINIC | Age: 30
End: 2025-06-11

## 2025-06-17 ENCOUNTER — APPOINTMENT (OUTPATIENT)
Dept: NEUROLOGY | Facility: CLINIC | Age: 30
End: 2025-06-17
Payer: MEDICAID

## 2025-06-17 ENCOUNTER — NON-APPOINTMENT (OUTPATIENT)
Age: 30
End: 2025-06-17

## 2025-06-17 PROCEDURE — 99213 OFFICE O/P EST LOW 20 MIN: CPT | Mod: 95

## 2025-06-17 PROCEDURE — G2211 COMPLEX E/M VISIT ADD ON: CPT | Mod: NC,95

## 2025-06-17 RX ORDER — LAMOTRIGINE 150 MG/1
150 TABLET ORAL
Qty: 180 | Refills: 1 | Status: ACTIVE | COMMUNITY
Start: 2025-06-17 | End: 1900-01-01

## 2025-06-17 RX ORDER — LEVETIRACETAM 500 MG/1
500 TABLET, FILM COATED, EXTENDED RELEASE ORAL
Qty: 180 | Refills: 3 | Status: ACTIVE | COMMUNITY
Start: 2025-06-17 | End: 1900-01-01

## 2025-06-22 LAB
ALBUMIN SERPL ELPH-MCNC: 4.6 G/DL
ALP BLD-CCNC: 97 U/L
ALT SERPL-CCNC: 31 U/L
ANION GAP SERPL CALC-SCNC: 13 MMOL/L
AST SERPL-CCNC: 19 U/L
BILIRUB SERPL-MCNC: 0.4 MG/DL
BUN SERPL-MCNC: 18 MG/DL
CALCIUM SERPL-MCNC: 9.4 MG/DL
CHLORIDE SERPL-SCNC: 104 MMOL/L
CO2 SERPL-SCNC: 20 MMOL/L
CREAT SERPL-MCNC: 0.73 MG/DL
EGFRCR SERPLBLD CKD-EPI 2021: 113 ML/MIN/1.73M2
GLUCOSE SERPL-MCNC: 88 MG/DL
HCT VFR BLD CALC: 36.4 %
HGB BLD-MCNC: 11.6 G/DL
MCHC RBC-ENTMCNC: 29.1 PG
MCHC RBC-ENTMCNC: 31.9 G/DL
MCV RBC AUTO: 91.5 FL
PLATELET # BLD AUTO: 255 K/UL
POTASSIUM SERPL-SCNC: 4.5 MMOL/L
PROT SERPL-MCNC: 7.2 G/DL
RBC # BLD: 3.98 M/UL
RBC # FLD: 13.2 %
SODIUM SERPL-SCNC: 137 MMOL/L
WBC # FLD AUTO: 6.28 K/UL

## 2025-06-24 ENCOUNTER — APPOINTMENT (OUTPATIENT)
Dept: NEUROLOGY | Facility: CLINIC | Age: 30
End: 2025-06-24
Payer: MEDICAID

## 2025-06-24 ENCOUNTER — TRANSCRIPTION ENCOUNTER (OUTPATIENT)
Age: 30
End: 2025-06-24

## 2025-06-24 VITALS
HEART RATE: 82 BPM | DIASTOLIC BLOOD PRESSURE: 60 MMHG | RESPIRATION RATE: 18 BRPM | WEIGHT: 92 LBS | OXYGEN SATURATION: 99 % | BODY MASS INDEX: 16.3 KG/M2 | HEIGHT: 63 IN | SYSTOLIC BLOOD PRESSURE: 93 MMHG

## 2025-06-24 PROBLEM — F41.8 ANXIETY WITH DEPRESSION: Status: ACTIVE | Noted: 2025-06-24

## 2025-06-24 PROCEDURE — 99214 OFFICE O/P EST MOD 30 MIN: CPT

## 2025-06-24 RX ORDER — MIRTAZAPINE 30 MG/1
30 TABLET, FILM COATED ORAL
Refills: 0 | Status: ACTIVE | COMMUNITY
Start: 2025-06-24

## 2025-06-26 LAB
LAMOTRIGINE SERPL-MCNC: 9 UG/ML
LEVETIRACETAM SERPL-MCNC: 7.2 UG/ML

## 2025-07-17 NOTE — PHYSICAL EXAM
[FreeTextEntry1] : alert and oriented x 3, speech fluent, names easily, follows requests, good recall for recent and remote events.\par EOM full without sustained nystagmus, PERRL, face symmetrical, no dysarthria\par Coord - no tremor\par Gait - stands without difficulty, normal gait. 
no